# Patient Record
Sex: FEMALE | Race: BLACK OR AFRICAN AMERICAN | NOT HISPANIC OR LATINO
[De-identification: names, ages, dates, MRNs, and addresses within clinical notes are randomized per-mention and may not be internally consistent; named-entity substitution may affect disease eponyms.]

---

## 2022-07-16 PROBLEM — Z00.00 ENCOUNTER FOR PREVENTIVE HEALTH EXAMINATION: Status: ACTIVE | Noted: 2022-07-16

## 2022-10-31 ENCOUNTER — APPOINTMENT (OUTPATIENT)
Dept: DERMATOLOGY | Facility: CLINIC | Age: 67
End: 2022-10-31

## 2023-03-30 ENCOUNTER — APPOINTMENT (OUTPATIENT)
Dept: OTOLARYNGOLOGY | Facility: CLINIC | Age: 68
End: 2023-03-30
Payer: COMMERCIAL

## 2023-03-30 DIAGNOSIS — Z78.9 OTHER SPECIFIED HEALTH STATUS: ICD-10-CM

## 2023-03-30 DIAGNOSIS — Z87.09 PERSONAL HISTORY OF OTHER DISEASES OF THE RESPIRATORY SYSTEM: ICD-10-CM

## 2023-03-30 PROCEDURE — 99203 OFFICE O/P NEW LOW 30 MIN: CPT | Mod: 25

## 2023-03-30 PROCEDURE — 31231 NASAL ENDOSCOPY DX: CPT

## 2023-03-30 RX ORDER — PHENYTOIN SODIUM EXTENDED 100 MG
CAPSULE ORAL
Refills: 0 | Status: ACTIVE | COMMUNITY

## 2023-03-30 RX ORDER — FAMOTIDINE 20 MG/1
20 TABLET, FILM COATED ORAL
Qty: 60 | Refills: 3 | Status: ACTIVE | COMMUNITY
Start: 2023-03-30 | End: 1900-01-01

## 2023-03-30 RX ORDER — HYDROCHLOROTHIAZIDE 12.5 MG/1
12.5 TABLET ORAL
Refills: 0 | Status: ACTIVE | COMMUNITY

## 2023-03-30 RX ORDER — ENALAPRIL MALEATE 20 MG/1
20 TABLET ORAL
Refills: 0 | Status: ACTIVE | COMMUNITY

## 2023-03-30 NOTE — CONSULT LETTER
[Dear  ___] : Dear  [unfilled], [Consult Letter:] : I had the pleasure of evaluating your patient, [unfilled]. [Please see my note below.] : Please see my note below. [Consult Closing:] : Thank you very much for allowing me to participate in the care of this patient.  If you have any questions, please do not hesitate to contact me. [Sincerely,] : Sincerely, [FreeTextEntry3] : Marline Denny MD\par [facility] at [facility]\par Otolaryngology – Head & Neck Surgery\par [facility]\par \par \par Department of Otolaryngology\par [facility]\par \par Office Tel: [phone]\par

## 2023-03-30 NOTE — HISTORY OF PRESENT ILLNESS
[de-identified] : FRANCHESKA GARNETT is a 67 year old patient referred by Dr. Freeman for evaluation for chronic sinusitis.  She has had symptoms since January.  She initially had an upper respiratory tract infection.  She then had coughing and wheezing along with chest congestion.  She has been on antibiotics, Flonase, Astelin, and inhalers.  The nasal sprays helped somewhat with the nasal symptoms.  She said the cough is better but she still has throat/chest congestion.\par \par She does not typically suffer from allergies.  She has not had testing\par No history of nasal or sinus surgery\par She has occasional reflux\par She denies history of smoking tobacco or marijuana

## 2023-03-30 NOTE — ASSESSMENT
[FreeTextEntry1] : She has had a chronic cough, nasal congestion, and chest congestion since January.  On exam, she had mild nasal mucosal edema and reflux related laryngeal changes.  She likely has reflux contributing to her symptoms.  \par \par PLAN\par \par -findings and management options discussed in detail with the patient. 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  She said she is also scheduled for CT scan of the chest\par -Reflux precautions and medication (OTC Pepcid).  She was given literature\par -I will clean her ears when she returns\par -follow up after the CT scan\par -call and return earlier if any concerns.

## 2023-04-12 ENCOUNTER — OUTPATIENT (OUTPATIENT)
Dept: OUTPATIENT SERVICES | Facility: HOSPITAL | Age: 68
LOS: 1 days | End: 2023-04-12

## 2023-04-12 ENCOUNTER — APPOINTMENT (OUTPATIENT)
Dept: CT IMAGING | Facility: CLINIC | Age: 68
End: 2023-04-12
Payer: COMMERCIAL

## 2023-04-12 PROCEDURE — 70486 CT MAXILLOFACIAL W/O DYE: CPT | Mod: 26

## 2023-04-27 ENCOUNTER — APPOINTMENT (OUTPATIENT)
Dept: OTOLARYNGOLOGY | Facility: CLINIC | Age: 68
End: 2023-04-27
Payer: COMMERCIAL

## 2023-04-27 DIAGNOSIS — H61.23 IMPACTED CERUMEN, BILATERAL: ICD-10-CM

## 2023-04-27 DIAGNOSIS — K21.9 GASTRO-ESOPHAGEAL REFLUX DISEASE W/OUT ESOPHAGITIS: ICD-10-CM

## 2023-04-27 DIAGNOSIS — M27.0 DEVELOPMENTAL DISORDERS OF JAWS: ICD-10-CM

## 2023-04-27 DIAGNOSIS — J31.0 CHRONIC RHINITIS: ICD-10-CM

## 2023-04-27 DIAGNOSIS — J32.8 OTHER CHRONIC SINUSITIS: ICD-10-CM

## 2023-04-27 PROCEDURE — 99213 OFFICE O/P EST LOW 20 MIN: CPT | Mod: 25

## 2023-04-27 PROCEDURE — 69210 REMOVE IMPACTED EAR WAX UNI: CPT

## 2023-04-27 NOTE — ASSESSMENT
[FreeTextEntry1] : She has had a chronic cough, nasal congestion, and chest congestion since January.  She was found to have a pulmonary foreign body.  She also has probable reflux and possible allergies.\par \par PLAN\par \par -findings and management options discussed in detail with the patient. \par -Nasal saline irrigations, nasal steroid spray, Astelin, and antihistamines as needed\par -She may consider allergy evaluation\par -Continue reflux precautions and Pepcid as needed.  Consider GI evaluation\par -She is going to follow-up with Dr. Bojorquez for removal of the foreign body.  She said that is scheduled in May\par -I will see her back after the procedure\par -call and return earlier if any concerns.

## 2023-04-27 NOTE — HISTORY OF PRESENT ILLNESS
[de-identified] : FRANCHESKA GARNETT is a 67 year old patient here for follow-up.  She has a history of chronic sinusitis and reflux.  She has also been having coughing, wheezing, chest congestion for a while.  She had a CT scan of the sinuses as well as a CT scan of the chest.  CT scan of the sinuses showed opacification of the left posterior ethmoid air cell bilateral torus palatini and a deviated septum.  The sinuses were otherwise clear.  On CT scan of the chest, she was found to have a probable foreign body in the left mainstem bronchus.  She does report that she had an episode of choking while eating in December.  She has been following reflux precautions.  She did have findings on exam that also suggested reflux at her last visit\par \par ENT History\par She does not typically suffer from allergies. She has not had testing\par No history of nasal or sinus surgery\par She has occasional reflux\par She denies history of smoking tobacco or marijuana

## 2023-04-27 NOTE — CONSULT LETTER
[Dear  ___] : Dear  [unfilled], [Courtesy Letter:] : I had the pleasure of seeing your patient, [unfilled], in my office today. [Please see my note below.] : Please see my note below. [Consult Closing:] : Thank you very much for allowing me to participate in the care of this patient.  If you have any questions, please do not hesitate to contact me. [Sincerely,] : Sincerely, [FreeTextEntry3] : Marline Denny MD\par [facility]\par Otolaryngology – Head & Neck Surgery\par [facility]\par \par \par Department of Otolaryngology\par [facility]\par \par Office Tel: [phone]\par

## 2024-11-18 ENCOUNTER — APPOINTMENT (OUTPATIENT)
Dept: CT IMAGING | Facility: CLINIC | Age: 69
End: 2024-11-18
Payer: COMMERCIAL

## 2024-11-18 PROCEDURE — 74177 CT ABD & PELVIS W/CONTRAST: CPT

## 2024-12-23 ENCOUNTER — APPOINTMENT (OUTPATIENT)
Dept: MRI IMAGING | Facility: CLINIC | Age: 69
End: 2024-12-23